# Patient Record
Sex: MALE | Race: WHITE | URBAN - METROPOLITAN AREA
[De-identification: names, ages, dates, MRNs, and addresses within clinical notes are randomized per-mention and may not be internally consistent; named-entity substitution may affect disease eponyms.]

---

## 2018-04-12 ENCOUNTER — OFFICE VISIT (OUTPATIENT)
Dept: FAMILY MEDICINE | Facility: CLINIC | Age: 23
End: 2018-04-12
Payer: COMMERCIAL

## 2018-04-12 VITALS
BODY MASS INDEX: 25.67 KG/M2 | TEMPERATURE: 98.1 F | DIASTOLIC BLOOD PRESSURE: 72 MMHG | HEART RATE: 90 BPM | SYSTOLIC BLOOD PRESSURE: 116 MMHG | HEIGHT: 74 IN | OXYGEN SATURATION: 97 % | WEIGHT: 200 LBS

## 2018-04-12 DIAGNOSIS — H10.13 ALLERGIC CONJUNCTIVITIS, BILATERAL: ICD-10-CM

## 2018-04-12 DIAGNOSIS — J02.9 SORE THROAT: Primary | ICD-10-CM

## 2018-04-12 DIAGNOSIS — J30.1 SEASONAL ALLERGIC RHINITIS DUE TO POLLEN, UNSPECIFIED CHRONICITY: ICD-10-CM

## 2018-04-12 PROCEDURE — 99213 OFFICE O/P EST LOW 20 MIN: CPT | Performed by: FAMILY MEDICINE

## 2018-04-12 RX ORDER — LORATADINE 10 MG/1
10 TABLET ORAL DAILY
Qty: 90 TABLET | Refills: 3 | COMMUNITY
Start: 2018-04-12

## 2018-04-12 RX ORDER — OLOPATADINE HYDROCHLORIDE 2 MG/ML
1 SOLUTION/ DROPS OPHTHALMIC DAILY
Qty: 2.5 ML | Refills: 3 | Status: SHIPPED | OUTPATIENT
Start: 2018-04-12

## 2018-04-12 NOTE — PROGRESS NOTES
"  SUBJECTIVE:                                                    David Paula is a 23 year old male who presents to clinic today for the following health issues:    Acute Illness   Acute illness concerns: Sore throat  Onset: last night- pt was in [place] and was feeling sick went away and now is back    Fever: no    Chills/Sweats: no    Headache (location?): No    Sinus Pressure:no    Conjunctivitis:  no    Ear Pain: no    Rhinorrhea: no    Congestion: no    Sore Throat: mostly at night- is not sore today     Cough: no    Wheeze: no    Decreased Appetite: no    Nausea: no    Vomiting: no    Diarrhea:  no    Dysuria/Freq.: no    Fatigue/Achiness: no    Sick/Strep Exposure: no     Therapies Tried and outcome: day quil helps the sore throat-     - Sore throat worsened last night, has mildly improved at time of appt. He also reports a painful right anterior lymph node this morning.       Seasonal Allergies -- Pt treats symptoms with eye drops, would like a refill. He has not tried treating with OTC Claritin or Zyrtec. Notes he is a , experiences symptoms during work; also allergic to cats & will notice symptoms at friends' houses who own one.           Problem list and histories reviewed & adjusted, as indicated.  Additional history: as documented    ROS:   Constitutional, HEENT, cardiovascular, pulmonary, GI, , musculoskeletal, neuro, skin, endocrine and psych systems are negative, except as otherwise noted.    This document serves as a record of the services and decisions personally performed and made by Ruben Vela MD. It was created on his behalf by Dianne Vega, a trained medical scribe. The creation of this document is based the provider's statements to the medical scribe.  Scribque Vega 4:58 PM, April 12, 2018    OBJECTIVE:                     /72  Pulse 90  Temp 98.1  F (36.7  C) (Oral)  Ht 1.88 m (6' 2\")  Wt 90.7 kg (200 lb)  SpO2 97%  BMI 25.68 kg/m2  GENERAL: no apparent " distress  EYES: Conjunctiva are not injected, no discharge.  EARS: Left TM -no erythema, no effusion,  not bulged.               Right TM -no erythema, no effusion,  not bulged.  NOSE: no discharge, no sinus tenderness  THROAT: no erythema, no exudate, no lesions  NECK: supple, no adenopathy.  CARDIAC: regular rate and rhythm, no murmur  RESP: clear, no wheezing, no rales, no rhonchi  ABD: soft, no distension, no tenderness  SKIN: No rashes    Diagnostic test results:  None  ASSESSMENT/PLAN:                       David was seen today for pharyngitis.    Diagnoses and all orders for this visit:    Sore throat - Advised to hydrate & use salt water gargles to alleviate pain; Monitor symptoms & report if they worsen or new ones arise    Seasonal allergic rhinitis due to pollen, unspecified chronicity/Allergic conjunctivitis, bilateral - Advised to use OTC Claritin in conjunction with filled medications to alleviate symptoms  -     loratadine (CLARITIN) 10 MG tablet; Take 1 tablet (10 mg) by mouth daily  -     olopatadine HCl (PATADAY) 0.2 % SOLN; Place 1 drop into both eyes daily  -     ketotifen (ZADITOR/REFRESH ANTI-ITCH) 0.025 % SOLN ophthalmic solution; Place 1 drop into both eyes 2 times daily as needed for itching - 2nd dose 8 hours after the first        Symptomatic cares and fever control(if indicated) discussed.  Risks and benefits of meds discussed.    See patient instructions.    The patient has no Health Maintenance topics of status Overdue, Due On, or Due Soon        The information in this document, created by the medical scribe for me, accurately reflects the services I personally performed and the decisions made by me. I have reviewed and approved this document for accuracy prior to leaving the patient care area.  4:58 PM, 04/12/18        Eric Vela MD

## 2018-04-12 NOTE — MR AVS SNAPSHOT
"              After Visit Summary   4/12/2018    David Paula    MRN: [id number]           Patient Information     Date Of Birth          1995        Visit Information        Provider Department      4/12/2018 4:20 PM Ruben Vela MD [facility]        Today's Diagnoses     Sore throat    -  1    Seasonal allergic rhinitis due to pollen, unspecified chronicity        Allergic conjunctivitis, bilateral           Follow-ups after your visit        Follow-up notes from your care team     Return if symptoms worsen or fail to improve.      Who to contact     If you have questions or need follow up information about today's clinic visit or your schedule please contact [facility] directly at [phone].  Normal or non-critical lab and imaging results will be communicated to you by MyChart, letter or phone within 4 business days after the clinic has received the results. If you do not hear from us within 7 days, please contact the clinic through MyChart or phone. If you have a critical or abnormal lab result, we will notify you by phone as soon as possible.  Submit refill requests through [employer] or call your pharmacy and they will forward the refill request to us. Please allow 3 business days for your refill to be completed.          Additional Information About Your Visit        MyChart Information     [employer] lets you send messages to your doctor, view your test results, renew your prescriptions, schedule appointments and more. To sign up, go to [email or web address] . Click on \"Log in\" on the left side of the screen, which will take you to the Welcome page. Then click on \"Sign up Now\" on the right side of the page.     You will be asked to enter the access code listed below, as well as some personal information. Please follow the directions to create your username and password.     Your access code is: [email or web address]  Expires: 7/11/2018  5:05 PM     Your access code will " " in 90 days. If you need help or a new code, please call your [place] clinic or [phone].        Care EveryWhere ID     This is your Care EveryWhere ID. This could be used by other organizations to access your [facility] medical records  [id number]        Your Vitals Were     Pulse Temperature Height Pulse Oximetry BMI (Body Mass Index)       90 98.1  F (36.7  C) (Oral) 6' 2\" (1.88 m) 97% 25.68 kg/m2        Blood Pressure from Last 3 Encounters:   18 116/72   16 112/80    Weight from Last 3 Encounters:   18 200 lb (90.7 kg)   16 193 lb 9 oz (87.8 kg)              Today, you had the following     No orders found for display         Today's Medication Changes          These changes are accurate as of 18  5:05 PM.  If you have any questions, ask your nurse or doctor.               Start taking these medicines.        Dose/Directions    ketotifen 0.025 % Soln ophthalmic solution   Commonly known as:  ZADITOR/REFRESH ANTI-ITCH   Used for:  Allergic conjunctivitis, bilateral   Started by:  Ruben Vela MD        Dose:  1 drop   Place 1 drop into both eyes 2 times daily as needed for itching - 2nd dose 8 hours after the first   Quantity:  1 Bottle   Refills:  11       loratadine 10 MG tablet   Commonly known as:  CLARITIN   Used for:  Seasonal allergic rhinitis due to pollen, unspecified chronicity, Allergic conjunctivitis, bilateral   Started by:  Ruben Vela MD        Dose:  10 mg   Take 1 tablet (10 mg) by mouth daily   Quantity:  90 tablet   Refills:  3       olopatadine HCl 0.2 % Soln   Commonly known as:  PATADAY   Used for:  Seasonal allergic rhinitis due to pollen, unspecified chronicity, Allergic conjunctivitis, bilateral   Started by:  Ruben Vela MD        Dose:  1 drop   Place 1 drop into both eyes daily   Quantity:  2.5 mL   Refills:  3         Stop taking these medicines if you haven't already. Please contact your care team if you have questions.     " fluticasone 50 MCG/ACT spray   Commonly known as:  FLONASE   Stopped by:  Ruben Vela MD                Where to get your medicines      These medications were sent to [facility] - [place], [place] - [address]  [address], [facility] [address]     Phone:  [phone]     olopatadine HCl 0.2 % Soln         Some of these will need a paper prescription and others can be bought over the counter.  Ask your nurse if you have questions.     You don't need a prescription for these medications     ketotifen 0.025 % Soln ophthalmic solution    loratadine 10 MG tablet                Primary Care Provider Office Phone # Fax #    [facility] [phone]       [address] [place], [place] [id number]        Equal Access to Services     DERIC RODGERS : Hadii karel kaufmano Sosheree, waaxda luqadaha, qaybta kaalmada adeegyada, ras niño . So [facility] [phone].    ATENCIÓN: Si habla español, tiene a monk disposición servicios gratuitos de asistencia lingüística. [facility] [phone].    We comply with applicable federal civil rights laws and [place] laws. We do not discriminate on the basis of race, color, national origin, age, disability, sex, sexual orientation, or gender identity.            Thank you!     Thank you for choosing [facility]  for your care. Our goal is always to provide you with excellent care. Hearing back from our patients is one way we can continue to improve our services. Please take a few minutes to complete the written survey that you may receive in the mail after your visit with us. Thank you!             Your Updated Medication List - Protect others around you: Learn how to safely use, store and throw away your medicines at www.disposemymeds.org.          This list is accurate as of 4/12/18  5:05 PM.  Always use your most recent med list.                   Brand Name  Dispense Instructions for use Diagnosis    ketotifen 0.025 % Soln ophthalmic solution    ZADITOR/REFRESH ANTI-ITCH    1 Bottle    Place 1 drop into both eyes 2 times daily as needed for itching - 2nd dose 8 hours after the first    Allergic conjunctivitis, bilateral       loratadine 10 MG tablet    CLARITIN    90 tablet    Take 1 tablet (10 mg) by mouth daily    Seasonal allergic rhinitis due to pollen, unspecified chronicity, Allergic conjunctivitis, bilateral       olopatadine HCl 0.2 % Soln    PATADAY    2.5 mL    Place 1 drop into both eyes daily    Seasonal allergic rhinitis due to pollen, unspecified chronicity, Allergic conjunctivitis, bilateral

## 2018-04-12 NOTE — NURSING NOTE
"Chief Complaint   Patient presents with     Pharyngitis       Initial /72  Pulse 90  Temp 98.1  F (36.7  C) (Oral)  Ht 6' 2\" (1.88 m)  Wt 200 lb (90.7 kg)  SpO2 97%  BMI 25.68 kg/m2 Estimated body mass index is 25.68 kg/(m^2) as calculated from the following:    Height as of this encounter: 6' 2\" (1.88 m).    Weight as of this encounter: 200 lb (90.7 kg).  Medication Reconciliation: complete  "

## 2018-04-13 ENCOUNTER — TELEPHONE (OUTPATIENT)
Dept: FAMILY MEDICINE | Facility: CLINIC | Age: 23
End: 2018-04-13

## 2018-04-13 DIAGNOSIS — J34.89 SINUS PRESSURE: Primary | ICD-10-CM

## 2018-04-13 RX ORDER — AMOXICILLIN 875 MG
875 TABLET ORAL 2 TIMES DAILY
Qty: 20 TABLET | Refills: 0 | Status: SHIPPED | OUTPATIENT
Start: 2018-04-13 | End: 2018-08-07

## 2018-04-13 NOTE — TELEPHONE ENCOUNTER
Reason for call:  Patient reporting a symptom    Symptom or request: Pt calling as he was seen yesterday and was told to call back if he is not feeling better so we can call something in.  He states sore throat is better, but has a lot of pain in sinuses and teeth.  Asking if we can call something in to [facility] in [place] if it will be today.  Please call pt to advise    Duration (how long have symptoms been present):     Have you been treated for this before? Yes    Additional comments:     Phone Number patient can be reached at:  Cell number on file:    Telephone Information:   Mobile [phone]       Best Time:      Can we leave a detailed message on this number:  YES    Call taken on 4/13/2018 at 3:49 PM by Yanci Farooq

## 2018-04-13 NOTE — TELEPHONE ENCOUNTER
Called # below    Acute Illness   Acute illness concerns: Sinus  Onset: 2 Days    Fever: no    Chills/Sweats: no    Headache (location?): no    Sinus Pressure:YES- tender, facial pain and teeth pain    Conjunctivitis:  YES: bilateral    Ear Pain: no    Rhinorrhea: no    Congestion: no    Sore Throat: YES     Cough: no    Wheeze: no    Decreased Appetite: no    Nausea: no    Vomiting: no    Diarrhea:  no    Dysuria/Freq.: no    Fatigue/Achiness: no    Sick/Strep Exposure: no     Therapies Tried and outcome: Violette Kirby - no improvement    DENIES: CP, SOB, Difficulty Breathing, Dizziness/Lightheadedness, Numbness/Tingling, Severe HA    Message handled by Nurse Triage with [employer] - provider name: MD SHIRA - continue to monitor sx, call if no improvement on Monday. Rest, push fluids.  Advised patient of MD SHIRA message above. 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     Message handled by Nurse Triage with [employer] - provider name: MD SHIRA - amoxicillin 875mg BID x 10 days.  Rx sent  Patient informed    Laura Leonard RN  [place] Triage

## 2018-08-07 ENCOUNTER — HOSPITAL ENCOUNTER (EMERGENCY)
Facility: CLINIC | Age: 23
Discharge: HOME | End: 2018-08-07
Attending: EMERGENCY MEDICINE | Admitting: EMERGENCY MEDICINE
Payer: COMMERCIAL

## 2018-08-07 VITALS
SYSTOLIC BLOOD PRESSURE: 117 MMHG | RESPIRATION RATE: 18 BRPM | DIASTOLIC BLOOD PRESSURE: 64 MMHG | BODY MASS INDEX: 23.75 KG/M2 | OXYGEN SATURATION: 98 % | TEMPERATURE: 97.6 F | HEART RATE: 117 BPM | WEIGHT: 185 LBS

## 2018-08-07 DIAGNOSIS — J02.0 ACUTE STREPTOCOCCAL PHARYNGITIS: ICD-10-CM

## 2018-08-07 DIAGNOSIS — T67.5XXA HEAT EXHAUSTION, INITIAL ENCOUNTER: ICD-10-CM

## 2018-08-07 LAB
ALBUMIN SERPL-MCNC: 4.4 G/DL (ref 3.4–5)
ALP SERPL-CCNC: 101 U/L (ref 40–150)
ALT SERPL W P-5'-P-CCNC: 48 U/L (ref 0–70)
ANION GAP SERPL CALCULATED.3IONS-SCNC: 6 MMOL/L (ref 3–14)
AST SERPL W P-5'-P-CCNC: 16 U/L (ref 0–45)
BASOPHILS # BLD AUTO: 0 10E9/L (ref 0–0.2)
BASOPHILS NFR BLD AUTO: 0.2 %
BILIRUB SERPL-MCNC: 0.8 MG/DL (ref 0.2–1.3)
BUN SERPL-MCNC: 11 MG/DL (ref 7–30)
CALCIUM SERPL-MCNC: 8.9 MG/DL (ref 8.5–10.1)
CHLORIDE SERPL-SCNC: 103 MMOL/L (ref 94–109)
CO2 SERPL-SCNC: 27 MMOL/L (ref 20–32)
CREAT SERPL-MCNC: 1 MG/DL (ref 0.66–1.25)
DEPRECATED S PYO AG THROAT QL EIA: ABNORMAL
DIFFERENTIAL METHOD BLD: ABNORMAL
EOSINOPHIL # BLD AUTO: 0 10E9/L (ref 0–0.7)
EOSINOPHIL NFR BLD AUTO: 0.1 %
ERYTHROCYTE [DISTWIDTH] IN BLOOD BY AUTOMATED COUNT: 13.3 % (ref 10–15)
GFR SERPL CREATININE-BSD FRML MDRD: >90 ML/MIN/1.7M2
GLUCOSE SERPL-MCNC: 120 MG/DL (ref 70–99)
HCT VFR BLD AUTO: 45.7 % (ref 40–53)
HGB BLD-MCNC: 15.6 G/DL (ref 13.3–17.7)
IMM GRANULOCYTES # BLD: 0.1 10E9/L (ref 0–0.4)
IMM GRANULOCYTES NFR BLD: 0.4 %
LIPASE SERPL-CCNC: 55 U/L (ref 73–393)
LYMPHOCYTES # BLD AUTO: 0.5 10E9/L (ref 0.8–5.3)
LYMPHOCYTES NFR BLD AUTO: 3.6 %
MCH RBC QN AUTO: 29.1 PG (ref 26.5–33)
MCHC RBC AUTO-ENTMCNC: 34.1 G/DL (ref 31.5–36.5)
MCV RBC AUTO: 85 FL (ref 78–100)
MONOCYTES # BLD AUTO: 0.8 10E9/L (ref 0–1.3)
MONOCYTES NFR BLD AUTO: 5.6 %
NEUTROPHILS # BLD AUTO: 13.6 10E9/L (ref 1.6–8.3)
NEUTROPHILS NFR BLD AUTO: 90.1 %
NRBC # BLD AUTO: 0 10*3/UL
NRBC BLD AUTO-RTO: 0 /100
PLATELET # BLD AUTO: 206 10E9/L (ref 150–450)
POTASSIUM SERPL-SCNC: 3.6 MMOL/L (ref 3.4–5.3)
PROT SERPL-MCNC: 8.1 G/DL (ref 6.8–8.8)
RBC # BLD AUTO: 5.36 10E12/L (ref 4.4–5.9)
SODIUM SERPL-SCNC: 136 MMOL/L (ref 133–144)
SPECIMEN SOURCE: ABNORMAL
WBC # BLD AUTO: 15.1 10E9/L (ref 4–11)

## 2018-08-07 PROCEDURE — 87880 STREP A ASSAY W/OPTIC: CPT | Performed by: EMERGENCY MEDICINE

## 2018-08-07 PROCEDURE — 25000128 H RX IP 250 OP 636: Performed by: EMERGENCY MEDICINE

## 2018-08-07 PROCEDURE — 96361 HYDRATE IV INFUSION ADD-ON: CPT

## 2018-08-07 PROCEDURE — 96375 TX/PRO/DX INJ NEW DRUG ADDON: CPT

## 2018-08-07 PROCEDURE — 85025 COMPLETE CBC W/AUTO DIFF WBC: CPT | Performed by: EMERGENCY MEDICINE

## 2018-08-07 PROCEDURE — 83690 ASSAY OF LIPASE: CPT | Performed by: EMERGENCY MEDICINE

## 2018-08-07 PROCEDURE — 96374 THER/PROPH/DIAG INJ IV PUSH: CPT

## 2018-08-07 PROCEDURE — 80053 COMPREHEN METABOLIC PANEL: CPT | Performed by: EMERGENCY MEDICINE

## 2018-08-07 PROCEDURE — 99285 EMERGENCY DEPT VISIT HI MDM: CPT | Mod: 25

## 2018-08-07 RX ORDER — KETOROLAC TROMETHAMINE 15 MG/ML
15 INJECTION, SOLUTION INTRAMUSCULAR; INTRAVENOUS ONCE
Status: COMPLETED | OUTPATIENT
Start: 2018-08-07 | End: 2018-08-07

## 2018-08-07 RX ORDER — ONDANSETRON 4 MG/1
4 TABLET, ORALLY DISINTEGRATING ORAL EVERY 8 HOURS PRN
Qty: 10 TABLET | Refills: 0 | Status: SHIPPED | OUTPATIENT
Start: 2018-08-07 | End: 2018-08-10

## 2018-08-07 RX ORDER — DEXAMETHASONE SODIUM PHOSPHATE 10 MG/ML
10 INJECTION, SOLUTION INTRAMUSCULAR; INTRAVENOUS ONCE
Status: COMPLETED | OUTPATIENT
Start: 2018-08-07 | End: 2018-08-07

## 2018-08-07 RX ORDER — AZITHROMYCIN 250 MG/1
TABLET, FILM COATED ORAL
Qty: 6 TABLET | Refills: 0 | Status: SHIPPED | OUTPATIENT
Start: 2018-08-07

## 2018-08-07 RX ORDER — ONDANSETRON 2 MG/ML
4 INJECTION INTRAMUSCULAR; INTRAVENOUS ONCE
Status: COMPLETED | OUTPATIENT
Start: 2018-08-07 | End: 2018-08-07

## 2018-08-07 RX ADMIN — SODIUM CHLORIDE 1000 ML: 9 INJECTION, SOLUTION INTRAVENOUS at 00:24

## 2018-08-07 RX ADMIN — SODIUM CHLORIDE 1000 ML: 9 INJECTION, SOLUTION INTRAVENOUS at 02:39

## 2018-08-07 RX ADMIN — DEXAMETHASONE SODIUM PHOSPHATE 10 MG: 10 INJECTION, SOLUTION INTRAMUSCULAR; INTRAVENOUS at 02:39

## 2018-08-07 RX ADMIN — ONDANSETRON 4 MG: 2 INJECTION INTRAMUSCULAR; INTRAVENOUS at 00:35

## 2018-08-07 RX ADMIN — KETOROLAC TROMETHAMINE 15 MG: 15 INJECTION, SOLUTION INTRAMUSCULAR; INTRAVENOUS at 00:35

## 2018-08-07 ASSESSMENT — ENCOUNTER SYMPTOMS
HEADACHES: 1
NAUSEA: 1
FEVER: 1
ABDOMINAL PAIN: 0
VOMITING: 1
COLOR CHANGE: 1

## 2018-08-07 NOTE — ED AVS SNAPSHOT
[facility] Emergency Department    [address]    [facility] [address]    Phone:  [phone]    Fax:  [phone]                                       David Paula   MRN: [id number]    Department:  [facility] Emergency Department   Date of Visit:  8/7/2018           Patient Information     Date Of Birth          1995        Your diagnoses for this visit were:     Acute streptococcal pharyngitis     Heat exhaustion, initial encounter        You were seen by Marina Roldan MD.      Follow-up Information     Follow up with Clinic, [facility] In 2 days.    Why:  recheck    Contact information:    [facility] [address]  [phone]          Follow up with [facility] Emergency Department.    Specialty:  EMERGENCY MEDICINE    Why:  If symptoms worsen    Contact information:    [address]  [facility] [address]  [phone]        Discharge Instructions           Heat Exhaustion  Heat exhaustion is a condition that develops during prolonged exposure to heat. It is more likely to occur during strenuous activity, such as exercise or manual labor. Symptoms include a fast heartbeat, excess sweating, extreme tiredness, muscle cramps, headache, and weakness. They may also include stomach cramps, nausea, and vomiting. The person may be lightheaded and dizzy, and may even faint.  Treatment for heat exhaustion involves cooling the body down and replacing lost fluids, electrolytes, and salts. Cooling may be done with fans, cold cloths, or a cold-water bath. Fluids are best replaced by drinking electrolyte solution, a sports drink, or water with 2 teaspoons of salt added for each 8 ounces. If a person is very dehydrated, confused, or unable to drink, IV (intravenous) fluids will likely be needed.  Heat exhaustion can progress to a serious condition called heatstroke, so it should be treated right  away.  Home care  Continue to drink extra cold fluids at home during the next 12 to 24 hours. Water, electrolyte solution, or sports drinks are advised. Avoid alcohol and caffeine.  Preventing heat illness    Protect yourself from the heat. Wear lightweight, light-colored clothing and a broad-brimmed hat.    Drink plenty of fluids before and during activity.    Limit exercise in hot or very humid weather. If you have to be active in the heat, take frequent breaks to drink fluids and cool down.    Don't exercise when you are feeling ill.    Watch for symptoms of heat illness such as exhaustion, excess sweating, and lightheadedness. If any occur, move to a cool place, rest, and drink cool fluids. Lying down with your legs raised slightly can help you recover.    Don't use alcohol or caffeine.  Follow-up care  Follow up with your healthcare provider, or as advised.  When to seek medical advice  Call your healthcare provider right away if any of these occur:    You can't keep fluids down    Vomiting or diarrhea    Hot flushed skin    Symptoms get worse or you have new symptoms  Call 911  Call 911 for any of these symptoms of heatstroke:    Confusion    Irrational behavior    Hallucinations    Trouble walking    Seizures    Passing out    Fever of 104 F (40 C) or higher  Date Last Reviewed: 5/1/2017 2000-2017 [employer]. [address] [place] [address], [place] [address]. All rights reserved. This information is not intended as a substitute for professional medical care. Always follow your healthcare professional's instructions.      Discharge Instructions  Sore Throat  You were seen today for a sore throat.  Most (>80%) sore throats are caused by a virus. Antibiotics do not help with viral infections, but you can fight off the virus on your own.  In this case, your sore throat would be treated with medications for your pain and fever.    Strep throat is a kind of sore throat caused by Group A streptococcus  bacteria.  This type of sore throat is treated with antibiotics.  If you had a rapid test done today for strep throat and it did not show infection, a culture is done in some cases. The culture can take several days to complete. If the culture shows you have strep throat, we will call you and get you a prescription for antibiotics. We will not contact you with a negative culture result.  Generally, every Emergency Department visit should have a follow-up clinic visit with either a primary or a specialty clinic/provider. Please follow-up as instructed by your emergency provider today.  Return to the Emergency Department if:    If you have difficulty breathing.    If you are drooling because you are unable to swallow.    You become dehydrated due to difficulty drinking. Signs of dehydration include weakness, dry mouth, and urinating less than 3 times per day.    If you develop swelling of the neck or tongue.    If you develop a high fever with either severe or unusual headache or stiff neck.    Treatment:      Pain relief -- Non-prescription pain medications, such as Tylenol  (acetaminophen) or Motrin , Advil  (ibuprofen) are usually recommended for pain.  Do not use a medicine that you are allergic to, or if your provider has told you not to use it.    Soft or liquid diet. Concentrate on liquids to keep yourself hydrated. Cold liquids (popsicles, ice cream, etc.) may feel good on your throat.  If you were given a prescription for medicine here today, be sure to read all of the information (including the package insert) that comes with your prescription.  This will include important information about the medicine, its side effects, and any warnings that you need to know about.  The pharmacist who fills the prescription can provide more information and answer questions you may have about the medicine.  If you have questions or concerns that the pharmacist cannot address, please call or return to the Emergency  Department.   Remember that you can always come back to the Emergency Department if you are not able to see your regular provider in the amount of time listed above, if you get any new symptoms, or if there is anything that worries you.        24 Hour Appointment Hotline       To make an appointment at any [facility], call [phone] ([phone]). If you don't have a family doctor or clinic, we will help you find one. [facility] clinics are conveniently located to serve the needs of you and your family.             Review of your medicines      START taking        Dose / Directions Last dose taken    azithromycin 250 MG tablet   Commonly known as:  ZITHROMAX   Quantity:  6 tablet        Two tablets first day, then one tablet daily for four days.   Refills:  0        ondansetron 4 MG ODT tab   Commonly known as:  ZOFRAN ODT   Dose:  4 mg   Quantity:  10 tablet        Take 1 tablet (4 mg) by mouth every 8 hours as needed   Refills:  0          Our records show that you are taking the medicines listed below. If these are incorrect, please call your family doctor or clinic.        Dose / Directions Last dose taken    ketotifen 0.025 % Soln ophthalmic solution   Commonly known as:  ZADITOR/REFRESH ANTI-ITCH   Dose:  1 drop   Quantity:  1 Bottle        Place 1 drop into both eyes 2 times daily as needed for itching - 2nd dose 8 hours after the first   Refills:  11        loratadine 10 MG tablet   Commonly known as:  CLARITIN   Dose:  10 mg   Quantity:  90 tablet        Take 1 tablet (10 mg) by mouth daily   Refills:  3        olopatadine HCl 0.2 % Soln   Commonly known as:  PATADAY   Dose:  1 drop   Quantity:  2.5 mL        Place 1 drop into both eyes daily   Refills:  3                Prescriptions were sent or printed at these locations (2 Prescriptions)                   Other Prescriptions                Printed at Department/Unit printer (2 of 2)         azithromycin (ZITHROMAX) 250 MG tablet                ondansetron (ZOFRAN ODT) 4 MG ODT tab                Procedures and tests performed during your visit     CBC + differential    Comprehensive metabolic panel    Lipase    Rapid strep screen      Orders Needing Specimen Collection     None      Pending Results     No orders found from 8/5/2018 to 8/8/2018.            Pending Culture Results     No orders found from 8/5/2018 to 8/8/2018.            Pending Results Instructions     If you had any lab results that were not finalized at the time of your Discharge, you can call the ED Lab Result RN at [phone]. You will be contacted by this team for any positive Lab results or changes in treatment. The nurses are available 7 days a week from 10A to 6:30P.  You can leave a message 24 hours per day and they will return your call.        Test Results From Your Hospital Stay        8/7/2018 12:32 AM      Component Results     Component Value Ref Range & Units Status    WBC 15.1 (H) 4.0 - 11.0 10e9/L Final    RBC Count 5.36 4.4 - 5.9 10e12/L Final    Hemoglobin 15.6 13.3 - 17.7 g/dL Final    Hematocrit 45.7 40.0 - 53.0 % Final    MCV 85 78 - 100 fl Final    MCH 29.1 26.5 - 33.0 pg Final    MCHC 34.1 31.5 - 36.5 g/dL Final    RDW 13.3 10.0 - 15.0 % Final    Platelet Count 206 150 - 450 10e9/L Final    Diff Method Automated Method  Final    % Neutrophils 90.1 % Final    % Lymphocytes 3.6 % Final    % Monocytes 5.6 % Final    % Eosinophils 0.1 % Final    % Basophils 0.2 % Final    % Immature Granulocytes 0.4 % Final    Nucleated RBCs 0 0 /100 Final    Absolute Neutrophil 13.6 (H) 1.6 - 8.3 10e9/L Final    Absolute Lymphocytes 0.5 (L) 0.8 - 5.3 10e9/L Final    Absolute Monocytes 0.8 0.0 - 1.3 10e9/L Final    Absolute Eosinophils 0.0 0.0 - 0.7 10e9/L Final    Absolute Basophils 0.0 0.0 - 0.2 10e9/L Final    Abs Immature Granulocytes 0.1 0 - 0.4 10e9/L Final    Absolute Nucleated RBC 0.0  Final         8/7/2018 12:49 AM      Component Results     Component Value Ref Range &  Units Status    Sodium 136 133 - 144 mmol/L Final    Potassium 3.6 3.4 - 5.3 mmol/L Final    Chloride 103 94 - 109 mmol/L Final    Carbon Dioxide 27 20 - 32 mmol/L Final    Anion Gap 6 3 - 14 mmol/L Final    Glucose 120 (H) 70 - 99 mg/dL Final    Urea Nitrogen 11 7 - 30 mg/dL Final    Creatinine 1.00 0.66 - 1.25 mg/dL Final    GFR Estimate >90 >60 mL/min/1.7m2 Final    Non  GFR Calc    GFR Estimate If Black >90 >60 mL/min/1.7m2 Final    African American GFR Calc    Calcium 8.9 8.5 - 10.1 mg/dL Final    Bilirubin Total 0.8 0.2 - 1.3 mg/dL Final    Albumin 4.4 3.4 - 5.0 g/dL Final    Protein Total 8.1 6.8 - 8.8 g/dL Final    Alkaline Phosphatase 101 40 - 150 U/L Final    ALT 48 0 - 70 U/L Final    AST 16 0 - 45 U/L Final         8/7/2018 12:49 AM      Component Results     Component Value Ref Range & Units Status    Lipase 55 (L) 73 - 393 U/L Final         8/7/2018  2:26 AM      Component Results     Component    Specimen Description    Throat    Rapid Strep A Screen (Abnormal)    POSITIVE: Group A Streptococcal antigen detected by immunoassay.                Clinical Quality Measure: Blood Pressure Screening     Your blood pressure was checked while you were in the emergency department today. The last reading we obtained was  BP: 117/64 . Please read the guidelines below about what these numbers mean and what you should do about them.  If your systolic blood pressure (the top number) is less than 120 and your diastolic blood pressure (the bottom number) is less than 80, then your blood pressure is normal. There is nothing more that you need to do about it.  If your systolic blood pressure (the top number) is 120-139 or your diastolic blood pressure (the bottom number) is 80-89, your blood pressure may be higher than it should be. You should have your blood pressure rechecked within a year by a primary care provider.  If your systolic blood pressure (the top number) is 140 or greater or your diastolic  "blood pressure (the bottom number) is 90 or greater, you may have high blood pressure. High blood pressure is treatable, but if left untreated over time it can put you at risk for heart attack, stroke, or kidney failure. You should have your blood pressure rechecked by a primary care provider within the next 4 weeks.  If your provider in the emergency department today gave you specific instructions to follow-up with your doctor or provider even sooner than that, you should follow that instruction and not wait for up to 4 weeks for your follow-up visit.        Thank you for choosing [facility]       Thank you for choosing [facility] for your care. Our goal is always to provide you with excellent care. Hearing back from our patients is one way we can continue to improve our services. Please take a few minutes to complete the written survey that you may receive in the mail after you visit with us. Thank you!        [facility] Information     [employer] lets you send messages to your doctor, view your test results, renew your prescriptions, schedule appointments and more. To sign up, go to [email or web address] . Click on \"Log in\" on the left side of the screen, which will take you to the Welcome page. Then click on \"Sign up Now\" on the right side of the page.     You will be asked to enter the access code listed below, as well as some personal information. Please follow the directions to create your username and password.     Your access code is: [email or web address]  Expires: 2018  2:41 AM     Your access code will  in 90 days. If you need help or a new code, please call your [facility] clinic or [phone].        Care EveryWhere ID     This is your Care EveryWhere ID. This could be used by other organizations to access your [facility] medical records  [id number]        Equal Access to Services     DERIC RODGERS AH: reyna Rodgers, ras kaur " la'andrew waldo. So [facility] [phone].    ATENCIÓN: Si habla español, tiene a monk disposición servicios gratuitos de asistencia lingüística. Llame al [phone].    We comply with applicable federal civil rights laws and [place] laws. We do not discriminate on the basis of race, color, national origin, age, disability, sex, sexual orientation, or gender identity.            After Visit Summary       This is your record. Keep this with you and show to your community pharmacist(s) and doctor(s) at your next visit.

## 2018-08-07 NOTE — ED PROVIDER NOTES
History     Chief Complaint:  Nausea & Vomiting     HPI   David Paula is a 23 year old male who presents to the emergency department today for evaluation of nausea and vomiting. 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 Around 1600 he explains that he got nauseated, had multiple episodes of emesis, and then collapsed, explaining that he has not felt the same way since. At the time his father who was on site notes that he was pale and green. The patient's fiance notes that when he got home she took his temperature, which was 102. He states that he then went to bed and woke at 2315 with additional episodes of emesis, prompting him to present to the ED. Here the patient endorses a history of headaches due to overheating or lack of water and believes his episode today was due to the heat. He denies any abdominal pain, vision changes, difficulty moving his extremities, falls or injuries, or consumption of alcohol today.  Patient notes sore throat earlier in the day preceding this event.    Allergies:  Augmentin  Cats     Medications:    Ketotifen  Pataday    Past Medical History:    Medications reviewed. No pertinent medications.    Past Surgical History:    Tonsillectomy    Family History:    Father: diabetes, CAD, hypertension, hyperlipidemia    Social History:  The patient was accompanied to the ED by his fiance.  Smoking Status: Never Smoker  Smokeless Tobacco: Never Used  Alcohol Use: Negative  Marital Status:  Engaged    Review of Systems   Constitutional: Positive for fever.        Collapsed   Eyes: Negative for visual disturbance.   Gastrointestinal: Positive for nausea and vomiting. Negative for abdominal pain.   Skin: Positive for color change and pallor.   Neurological: Positive for headaches.        No difficulty moving extremities   All other systems reviewed and are negative.    Physical Exam     Patient  Vitals for the past 24 hrs:   BP Temp Temp src Pulse Heart Rate Resp SpO2 Weight   08/07/18 0230 117/64 - - - - - 98 % -   08/07/18 0216 - - - - - - 99 % -   08/07/18 0215 107/57 - - - - - 99 % -   08/07/18 0212 103/53 - - - - - - -   08/07/18 0200 - - - - - - 99 % -   08/07/18 0145 - - - - - - 97 % -   08/07/18 0130 - - - - - - 97 % -   08/07/18 0127 - - - - - - 97 % -   08/07/18 0121 - - - - - - 98 % -   08/07/18 0115 - - - - - - 98 % -   08/07/18 0112 - - - - - - 99 % -   08/07/18 0103 - - - - - - 98 % -   08/07/18 0100 - - - - - - 98 % -   08/07/18 0007 136/79 97.6  F (36.4  C) Temporal 117 117 18 99 % 83.9 kg (185 lb)     Physical Exam  Gen: alert  HEENT: PERRL, bilateral tonsillary erythema, no exudate  Neck: normal ROM  CV: tachycardic, no murmurs  Pulm: breath sounds equal, lungs clear  Abd: Soft, nontender  Back: no evidence of injury, no cva tenderness  MSK: no deformity, moves all extremities  Skin: no rash  Neuro: alert, appropriate conversation and interaction    Emergency Department Course     Laboratory:  Laboratory findings were communicated with the patient who voiced understanding of the findings.    Rapid Strep Screen: positive  CBC: WBC 15.1 (H), HGB 15.6,   CMP: Glucose 120 (H) o/w WNL (Creatinine 1.00)  Lipase: 55 (L)    Interventions:  0024 NS 1000 ml IV  0035 Toradol 15 mg IV  0035 Zofran 4 mg IV  0239 NS 1000 ml IV  0239 Decadron 10 mg IV    Emergency Department Course:    0009 Nursing notes and vitals reviewed.    0021 I performed an exam of the patient as documented above.     0023 IV was inserted and blood was drawn for laboratory testing, results above.    0137 Recheck and update. Patient states he feels marginally better but complains of sore throat. Will order strep test. Headache is improved.     0141 I obtained a throat sample for laboratory testing as documented above.     0234 Recheck and update. Patient taking fluids.    0301 I personally reviewed the laboratory results  with the patient and answered all related questions prior to discharge.    Impression & Plan      Medical Decision Making:  David Paula is a 23 year old male who presents to the emergency department today for vomiting, headache, and fever. He also noted sore throat. 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 Evaluation today shows likely a combination of strep pharyngitis and heat exhaustion as the cause of the patient's symptoms. Laboratory studies show leukocytosis, however no other acute abnormality. The patient improved with the above interventions. He is able to take PO. He notes sore throat preceding his symptoms today. Rapid strep positive. We will treat for strep pharyngitis. No clinical evidence of meningitis or other serious viral infection. Plan for discharge home. Continue to push oral fluids. Azithromycin for strep.     Diagnosis:    ICD-10-CM    1. Acute streptococcal pharyngitis J02.0    2. Heat exhaustion, initial encounter T67.5XXA      Disposition:   The patient is discharged to home.    Discharge Medications:  Discharge Medication List as of 8/7/2018  2:42 AM      START taking these medications    Details   azithromycin (ZITHROMAX) 250 MG tablet Two tablets first day, then one tablet daily for four days., Disp-6 tablet, R-0, Local Print      ondansetron (ZOFRAN ODT) 4 MG ODT tab Take 1 tablet (4 mg) by mouth every 8 hours as needed, Disp-10 tablet, R-0, Local Print           Scribe Disclosure:  Kailey MARCELO, am serving as a scribe at 12:15 AM on 8/7/2018 to document services personally performed by Marina Roldan based on my observations and the provider's statements to me.    [facility] EMERGENCY DEPARTMENT       Marina Roldan MD  08/07/18 7534

## 2018-08-07 NOTE — DISCHARGE INSTRUCTIONS
Heat Exhaustion  Heat exhaustion is a condition that develops during prolonged exposure to heat. It is more likely to occur during strenuous activity, such as exercise or manual labor. Symptoms include a fast heartbeat, excess sweating, extreme tiredness, muscle cramps, headache, and weakness. They may also include stomach cramps, nausea, and vomiting. The person may be lightheaded and dizzy, and may even faint.  Treatment for heat exhaustion involves cooling the body down and replacing lost fluids, electrolytes, and salts. Cooling may be done with fans, cold cloths, or a cold-water bath. Fluids are best replaced by drinking electrolyte solution, a sports drink, or water with 2 teaspoons of salt added for each 8 ounces. If a person is very dehydrated, confused, or unable to drink, IV (intravenous) fluids will likely be needed.  Heat exhaustion can progress to a serious condition called heatstroke, so it should be treated right away.  Home care  Continue to drink extra cold fluids at home during the next 12 to 24 hours. Water, electrolyte solution, or sports drinks are advised. Avoid alcohol and caffeine.  Preventing heat illness    Protect yourself from the heat. Wear lightweight, light-colored clothing and a broad-brimmed hat.    Drink plenty of fluids before and during activity.    Limit exercise in hot or very humid weather. If you have to be active in the heat, take frequent breaks to drink fluids and cool down.    Don't exercise when you are feeling ill.    Watch for symptoms of heat illness such as exhaustion, excess sweating, and lightheadedness. If any occur, move to a cool place, rest, and drink cool fluids. Lying down with your legs raised slightly can help you recover.    Don't use alcohol or caffeine.  Follow-up care  Follow up with your healthcare provider, or as advised.  When to seek medical advice  Call your healthcare provider right away if any of these occur:    You can't keep fluids  down    Vomiting or diarrhea    Hot flushed skin    Symptoms get worse or you have new symptoms  Call 911  Call 911 for any of these symptoms of heatstroke:    Confusion    Irrational behavior    Hallucinations    Trouble walking    Seizures    Passing out    Fever of 104 F (40 C) or higher  Date Last Reviewed: 5/1/2017 2000-2017 [employer]. [address] [place] [address]. All rights reserved. This information is not intended as a substitute for professional medical care. Always follow your healthcare professional's instructions.      Discharge Instructions  Sore Throat  You were seen today for a sore throat.  Most (>80%) sore throats are caused by a virus. Antibiotics do not help with viral infections, but you can fight off the virus on your own.  In this case, your sore throat would be treated with medications for your pain and fever.    Strep throat is a kind of sore throat caused by Group A streptococcus bacteria.  This type of sore throat is treated with antibiotics.  If you had a rapid test done today for strep throat and it did not show infection, a culture is done in some cases. The culture can take several days to complete. If the culture shows you have strep throat, we will call you and get you a prescription for antibiotics. We will not contact you with a negative culture result.  Generally, every Emergency Department visit should have a follow-up clinic visit with either a primary or a specialty clinic/provider. Please follow-up as instructed by your emergency provider today.  Return to the Emergency Department if:    If you have difficulty breathing.    If you are drooling because you are unable to swallow.    You become dehydrated due to difficulty drinking. Signs of dehydration include weakness, dry mouth, and urinating less than 3 times per day.    If you develop swelling of the neck or tongue.    If you develop a high fever with either severe or unusual headache or stiff  neck.    Treatment:      Pain relief -- Non-prescription pain medications, such as Tylenol  (acetaminophen) or Motrin , Advil  (ibuprofen) are usually recommended for pain.  Do not use a medicine that you are allergic to, or if your provider has told you not to use it.    Soft or liquid diet. Concentrate on liquids to keep yourself hydrated. Cold liquids (popsicles, ice cream, etc.) may feel good on your throat.  If you were given a prescription for medicine here today, be sure to read all of the information (including the package insert) that comes with your prescription.  This will include important information about the medicine, its side effects, and any warnings that you need to know about.  The pharmacist who fills the prescription can provide more information and answer questions you may have about the medicine.  If you have questions or concerns that the pharmacist cannot address, please call or return to the Emergency Department.   Remember that you can always come back to the Emergency Department if you are not able to see your regular provider in the amount of time listed above, if you get any new symptoms, or if there is anything that worries you.

## 2018-08-07 NOTE — ED AVS SNAPSHOT
[facility] Emergency Department    [address]    [facility] [address]    Phone:  [phone]    Fax:  [phone]                                       David Paula   MRN: [id number]    Department:  [facility] Emergency Department   Date of Visit:  8/7/2018           After Visit Summary Signature Page     I have received my discharge instructions, and my questions have been answered. I have discussed any challenges I see with this plan with the nurse or doctor.    ..........................................................................................................................................  Patient/Patient Representative Signature      ..........................................................................................................................................  Patient Representative Print Name and Relationship to Patient    ..................................................               ................................................  Date                                            Time    ..........................................................................................................................................  Reviewed by Signature/Title    ...................................................              ..............................................  Date                                                            Time

## 2024-12-12 ENCOUNTER — TELEPHONE (OUTPATIENT)
Dept: FAMILY MEDICINE | Facility: CLINIC | Age: 29
End: 2024-12-12

## 2024-12-12 NOTE — TELEPHONE ENCOUNTER
Patient calls to find out what his allergies are. Chart review, no allergy panel noted advised patient to contact medical records, number provided.     Patient has been cutting a lot of trees and feels bother  Symptoms present for the last month-two months.   -Sinus congestion, green sputum   Feels congestion flares when more frequently when he is cutting trees, Notes it is harder to breath because nose is plugged  Denies fever, difficulty breathing/shortness of breath, chest pain      Patient has tried some OTC medication and feels this helped some. Has tried Claritin. Has not tried nasal sprays.     Scheduled patient for appointment today in [place] with Laura Mckeon CNP.     MONE MazaN, RN  [facility]